# Patient Record
Sex: FEMALE | Race: WHITE | NOT HISPANIC OR LATINO | Employment: FULL TIME | URBAN - METROPOLITAN AREA
[De-identification: names, ages, dates, MRNs, and addresses within clinical notes are randomized per-mention and may not be internally consistent; named-entity substitution may affect disease eponyms.]

---
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
## 2019-02-27 ENCOUNTER — OFFICE VISIT (OUTPATIENT)
Dept: FAMILY MEDICINE CLINIC | Facility: CLINIC | Age: 19
End: 2019-02-27
Payer: COMMERCIAL

## 2019-02-27 ENCOUNTER — APPOINTMENT (OUTPATIENT)
Dept: LAB | Facility: MEDICAL CENTER | Age: 19
End: 2019-02-27
Payer: COMMERCIAL

## 2019-02-27 VITALS
DIASTOLIC BLOOD PRESSURE: 70 MMHG | BODY MASS INDEX: 24.79 KG/M2 | HEIGHT: 65 IN | SYSTOLIC BLOOD PRESSURE: 106 MMHG | WEIGHT: 148.8 LBS

## 2019-02-27 DIAGNOSIS — G56.03 BILATERAL CARPAL TUNNEL SYNDROME: Primary | ICD-10-CM

## 2019-02-27 DIAGNOSIS — G56.03 CARPAL TUNNEL SYNDROME, BILATERAL: Primary | ICD-10-CM

## 2019-02-27 DIAGNOSIS — L70.0 ACNE VULGARIS: ICD-10-CM

## 2019-02-27 PROCEDURE — 99213 OFFICE O/P EST LOW 20 MIN: CPT | Performed by: FAMILY MEDICINE

## 2019-02-27 PROCEDURE — 86038 ANTINUCLEAR ANTIBODIES: CPT

## 2019-02-27 PROCEDURE — 86430 RHEUMATOID FACTOR TEST QUAL: CPT | Performed by: FAMILY MEDICINE

## 2019-02-27 PROCEDURE — 36415 COLL VENOUS BLD VENIPUNCTURE: CPT | Performed by: FAMILY MEDICINE

## 2019-02-27 RX ORDER — NAPROXEN 500 MG/1
500 TABLET ORAL 2 TIMES DAILY WITH MEALS
Qty: 60 TABLET | Refills: 1 | Status: SHIPPED | OUTPATIENT
Start: 2019-02-27

## 2019-02-27 RX ORDER — CLINDAMYCIN PHOSPHATE 10 UG/ML
LOTION TOPICAL 2 TIMES DAILY
Qty: 60 ML | Refills: 5 | Status: SHIPPED | OUTPATIENT
Start: 2019-02-27

## 2019-02-27 NOTE — PROGRESS NOTES
Assessment/Plan: For her hands, we will refer her to Orthopedics  Patient will get her blood work done today  Refilled her naproxen  For her acne, Rx for Cleocin lotion  She will call us if that does not help her, if not, we will refer her to Dermatology  Follow-up in 6 months or p r n  No problem-specific Assessment & Plan notes found for this encounter  Diagnoses and all orders for this visit:    Bilateral carpal tunnel syndrome  -     Ambulatory referral to Orthopedic Surgery; Future  -     naproxen (NAPROSYN) 500 mg tablet; Take 1 tablet (500 mg total) by mouth 2 (two) times a day with meals  -     EVELINE w/Reflex  -     RF Screen w/ Reflex to Titer    Acne vulgaris  -     clindamycin (CLEOCIN T) 1 % lotion; Apply topically 2 (two) times a day          Subjective:      Patient ID: Gustavo Arreaga is a 22 y o  female  Patient here today for follow-up  Patient continues to have bilateral hand stiffness pain and numbness  It was worse in the right, now it is in both hands  Patient is still welding  She does wear the splint, it does help a little  Patient also concerned about acne on her face, she has tried over-the-counter medications without much relief  Arthritis   Presents for follow-up visit  She complains of pain and stiffness  The symptoms have been worsening  Affected location: Bilateral hands  Pertinent negatives include no fatigue, fever or rash  Compliance with total regimen is %  Compliance with medications is %  The following portions of the patient's history were reviewed and updated as appropriate:   She  has a past medical history of Hearing loss    She   Patient Active Problem List    Diagnosis Date Noted    Acne vulgaris 02/27/2019    Syncope and collapse 11/06/2018    Other headache syndrome 11/06/2018    Spell of altered cognition 11/06/2018    Bilateral carpal tunnel syndrome 10/23/2018    Positive EVELINE (antinuclear antibody) 10/23/2018    Vitamin D deficiency 10/10/2018     She  has a past surgical history that includes Tonsillectomy and adenoidectomy and Tooth extraction  Her family history includes Arthritis in her maternal grandmother; Cervical cancer in her paternal grandmother; Diabetes in her paternal grandfather; Endometriosis in her mother; Heart disease in her father; Seizures in her father  She  reports that she has never smoked  She has never used smokeless tobacco  She reports that she does not drink alcohol  Her drug history is not on file  Current Outpatient Medications   Medication Sig Dispense Refill    naproxen (NAPROSYN) 500 mg tablet Take 1 tablet (500 mg total) by mouth 2 (two) times a day with meals 60 tablet 1    cholecalciferol (VITAMIN D3) 1,000 units tablet Take 2 tablets (2,000 Units total) by mouth daily (Patient not taking: Reported on 2/27/2019) 60 tablet 2    clindamycin (CLEOCIN T) 1 % lotion Apply topically 2 (two) times a day 60 mL 5     No current facility-administered medications for this visit  Current Outpatient Medications on File Prior to Visit   Medication Sig    [DISCONTINUED] naproxen (NAPROSYN) 500 mg tablet Take 1 tablet (500 mg total) by mouth 2 (two) times a day with meals    cholecalciferol (VITAMIN D3) 1,000 units tablet Take 2 tablets (2,000 Units total) by mouth daily (Patient not taking: Reported on 2/27/2019)     No current facility-administered medications on file prior to visit  She has No Known Allergies       Review of Systems   Constitutional: Negative  Negative for fatigue and fever  Respiratory: Negative  Cardiovascular: Negative  Gastrointestinal: Negative  Genitourinary: Negative  Musculoskeletal: Positive for arthralgias, arthritis and stiffness  Skin: Negative for rash          As per HPI         Objective:      /70   Ht 5' 4 5" (1 638 m)   Wt 67 5 kg (148 lb 12 8 oz)   BMI 25 15 kg/m²          Physical Exam   Constitutional: She is oriented to person, place, and time  She appears well-developed and well-nourished  No distress  HENT:   Head: Normocephalic and atraumatic  Eyes: Conjunctivae are normal    Cardiovascular: Normal rate, regular rhythm and normal heart sounds  Exam reveals no gallop and no friction rub  No murmur heard  Pulmonary/Chest: Effort normal and breath sounds normal  No respiratory distress  She has no wheezes  She has no rales  Musculoskeletal: She exhibits tenderness (Bilateral hands)  She exhibits no edema  Neurological: She is alert and oriented to person, place, and time  Skin: She is not diaphoretic  Mild acne on her face   Psychiatric: She has a normal mood and affect  Her behavior is normal  Judgment and thought content normal    Vitals reviewed

## 2019-02-28 LAB — RHEUMATOID FACT SER QL LA: NEGATIVE

## 2019-03-01 LAB — RYE IGE QN: NEGATIVE

## 2020-01-25 ENCOUNTER — HOSPITAL ENCOUNTER (EMERGENCY)
Facility: HOSPITAL | Age: 20
Discharge: ANOTHER HOSPITAL | End: 2020-01-25
Attending: EMERGENCY MEDICINE | Admitting: EMERGENCY MEDICINE
Payer: COMMERCIAL

## 2020-01-25 VITALS
BODY MASS INDEX: 30.4 KG/M2 | HEART RATE: 85 BPM | WEIGHT: 179.9 LBS | SYSTOLIC BLOOD PRESSURE: 144 MMHG | RESPIRATION RATE: 18 BRPM | OXYGEN SATURATION: 97 % | DIASTOLIC BLOOD PRESSURE: 77 MMHG

## 2020-01-25 DIAGNOSIS — Z3A.39 39 WEEKS GESTATION OF PREGNANCY: Primary | ICD-10-CM

## 2020-01-25 PROCEDURE — 96361 HYDRATE IV INFUSION ADD-ON: CPT

## 2020-01-25 PROCEDURE — 99283 EMERGENCY DEPT VISIT LOW MDM: CPT | Performed by: EMERGENCY MEDICINE

## 2020-01-25 PROCEDURE — 96374 THER/PROPH/DIAG INJ IV PUSH: CPT

## 2020-01-25 PROCEDURE — 99284 EMERGENCY DEPT VISIT MOD MDM: CPT

## 2020-01-25 RX ORDER — SODIUM CHLORIDE 9 MG/ML
1000 INJECTION, SOLUTION INTRAVENOUS ONCE
Status: COMPLETED | OUTPATIENT
Start: 2020-01-25 | End: 2020-01-25

## 2020-01-25 RX ORDER — ONDANSETRON 4 MG/1
4 TABLET, FILM COATED ORAL EVERY 8 HOURS PRN
COMMUNITY

## 2020-01-25 RX ORDER — ONDANSETRON 4 MG/1
4 TABLET, ORALLY DISINTEGRATING ORAL ONCE
Status: DISCONTINUED | OUTPATIENT
Start: 2020-01-25 | End: 2020-01-25 | Stop reason: HOSPADM

## 2020-01-25 RX ORDER — ONDANSETRON 2 MG/ML
4 INJECTION INTRAMUSCULAR; INTRAVENOUS ONCE
Status: COMPLETED | OUTPATIENT
Start: 2020-01-25 | End: 2020-01-25

## 2020-01-25 RX ADMIN — SODIUM CHLORIDE 1000 ML/HR: 0.9 INJECTION, SOLUTION INTRAVENOUS at 00:21

## 2020-01-25 RX ADMIN — ONDANSETRON 4 MG: 2 INJECTION INTRAMUSCULAR; INTRAVENOUS at 00:21

## 2020-01-25 NOTE — ED NOTES
Patient vomiting at this time  ODT zofran ordered for patient to take prior to leaving       Nikki Marquez RN  01/25/20 [id number]

## 2020-01-25 NOTE — ED NOTES
Patient asking about transport time  This writer explained that transfer center is working on getting an ambulance  Patient states, "I gotta get out of here   I'm just going to [address] " Dr Eva Lugo aware and in to speak with patient      José Manuel Fritz RN  01/25/20 3054

## 2020-01-25 NOTE — EMTALA/ACUTE CARE TRANSFER
[address] [place] [id number]  Dept: [phone]      EMTALA TRANSFER CONSENT    NAME Ramona Johansen                                         2000                              MRN [id number]    I have been informed of my rights regarding examination, treatment, and transfer   by Dr Christiano Kidd DO    Benefits: Patient preference, Specialized equipment and/or services available at the receiving facility (Include comment)________________________    Risks: Potential for delay in receiving treatment, Increased discomfort during transfer, Possible worsening of condition or death during transfer, Potential deterioration of medical condition, Loss of IV      Consent for Transfer:  I acknowledge that my medical condition has been evaluated and explained to me by the emergency department physician or other qualified medical person and/or my attending physician, who has recommended that I be transferred to the service of  Accepting Physician: Jayme Sebastian at [facility] Name, [address] : [address] [facility]  The above potential benefits of such transfer, the potential risks associated with such transfer, and the probable risks of not being transferred have been explained to me, and I fully understand them  The doctor has explained that, in my case, the benefits of transfer outweigh the risks  I agree to be transferred  I authorize the performance of emergency medical procedures and treatments upon me in both transit and upon arrival at the receiving facility  Additionally, I authorize the release of any and all medical records to the receiving facility and request they be transported with me, if possible  I understand that the safest mode of transportation during a medical emergency is an ambulance and that the Hospital advocates the use of this mode of transport   Risks of traveling to the receiving facility by car, including absence of medical control, life sustaining equipment, such as oxygen, and medical personnel has been explained to me and I fully understand them  (CATHERINE CORRECT BOX BELOW)  [  ]  I consent to the stated transfer and to be transported by ambulance/helicopter  [  ]  I consent to the stated transfer, but refuse transportation by ambulance and accept full responsibility for my transportation by car  I understand the risks of non-ambulance transfers and I exonerate the Hospital and its staff from any deterioration in my condition that results from this refusal     X___________________________________________    DATE  20  TIME________  Signature of patient or legally responsible individual signing on patient behalf           RELATIONSHIP TO PATIENT_________________________          Provider Certification    NAME Bobby Keene                                         2000                              MRN [id number]    A medical screening exam was performed on the above named patient  Based on the examination:    Condition Necessitating Transfer The encounter diagnosis was 39 weeks gestation of pregnancy  Patient Condition: Pregnant woman having contractions    Reason for Transfer: Level of Care needed not available at this facility    Transfer Requirements: [address]   · Space available and qualified personnel available for treatment as acknowledged by Janeth Russell (R)  · Agreed to accept transfer and to provide appropriate medical treatment as acknowledged by       Maggie Leon  · Appropriate medical records of the examination and treatment of the patient are provided at the time of transfer   [address], [address] _______  · Transfer will be performed by qualified personnel from    and appropriate transfer equipment as required, including the use of necessary and appropriate life support measures      Provider Certification: I have examined the patient and explained the following risks and benefits of being transferred/refusing transfer to the patient/family:  General risk, such as traffic hazards, adverse weather conditions, rough terrain or turbulence, possible failure of equipment (including vehicle or aircraft), or consequences of actions of persons outside the control of the transport personnel, Unanticipated needs of medical equipment and personnel during transport, Risk of worsening condition, The possibility of a transport vehicle being unavailable      Based on these reasonable risks and benefits to the patient and/or the unborn child(yuridia), and based upon the information available at the time of the patients examination, I certify that the medical benefits reasonably to be expected from the provision of appropriate medical treatments at another medical facility outweigh the increasing risks, if any, to the individuals medical condition, and in the case of labor to the unborn child, from effecting the transfer      X____________________________________________ DATE 01/25/20        TIME_______      ORIGINAL - SEND TO MEDICAL RECORDS   COPY - SEND WITH PATIENT DURING TRANSFER

## 2020-01-25 NOTE — ED PROVIDER NOTES
History  Chief Complaint   Patient presents with    Contractions     Patient reports contractions all day but states that they are now 3 minutes apart  Her water didn't break and states that she has been losing her mucus plug "for a while now"     23-year-old female  at 44 weeks of gestation presents to ED with contractions  Patient was recently seen at [address] [place] [address] [address] and cuts down for a checkup  Patient states that she has lost her mucus plug, however isn't aware if she had her fluidly  Patient describes Q 3 minutes contractions with nausea  Patient is requested to be transferred to [facility]           Prior to Admission Medications   Prescriptions Last Dose Informant Patient Reported? Taking? cholecalciferol (VITAMIN D3) 1,000 units tablet Not Taking at Unknown time  No No   Sig: Take 2 tablets (2,000 Units total) by mouth daily   Patient not taking: Reported on 2019   clindamycin (CLEOCIN T) 1 % lotion Not Taking at Unknown time  No No   Sig: Apply topically 2 (two) times a day   Patient not taking: Reported on 2020   naproxen (NAPROSYN) 500 mg tablet Not Taking at Unknown time  No No   Sig: Take 1 tablet (500 mg total) by mouth 2 (two) times a day with meals   Patient not taking: Reported on 2020   ondansetron (ZOFRAN) 4 mg tablet   Yes Yes   Sig: Take 4 mg by mouth every 8 (eight) hours as needed for nausea or vomiting      Facility-Administered Medications: None       Past Medical History:   Diagnosis Date    Hearing loss        Past Surgical History:   Procedure Laterality Date    TONSILLECTOMY      TONSILLECTOMY AND ADENOIDECTOMY      TOOTH EXTRACTION         Family History   Problem Relation Age of Onset    Endometriosis Mother     Seizures Father     Heart disease Father     Arthritis Maternal Grandmother     Cervical cancer Paternal Grandmother     Diabetes Paternal Grandfather      I have reviewed and agree with the history as documented      Social History Tobacco Use    Smoking status: Never Smoker    Smokeless tobacco: Never Used   Substance Use Topics    Alcohol use: No    Drug use: Not Currently        Review of Systems   Gastrointestinal: Positive for abdominal pain, nausea and vomiting  All other systems reviewed and are negative  Physical Exam  Physical Exam   Constitutional: She is oriented to person, place, and time  She appears well-developed and well-nourished  She appears distressed  HENT:   Head: Normocephalic and atraumatic  Eyes: Pupils are equal, round, and reactive to light  EOM are normal    Neck: Normal range of motion  Neck supple  Cardiovascular: Normal rate, regular rhythm, normal heart sounds and intact distal pulses  Pulmonary/Chest: Effort normal and breath sounds normal  No stridor  No respiratory distress  Abdominal: Soft  Bowel sounds are normal  She exhibits no distension  Musculoskeletal: Normal range of motion  Neurological: She is alert and oriented to person, place, and time  Skin: Skin is warm and dry  Psychiatric: She has a normal mood and affect  Her behavior is normal    Nursing note and vitals reviewed        Vital Signs  ED Triage Vitals [01/25/20 0009]   Temp Pulse Respirations Blood Pressure SpO2   -- 85 18 144/77 97 %      Temp Source Heart Rate Source Patient Position - Orthostatic VS BP Location FiO2 (%)   Oral Monitor Lying Right arm --      Pain Score       Worst Possible Pain           Vitals:    01/25/20 0009   BP: 144/77   Pulse: 85   Patient Position - Orthostatic VS: Lying         Visual Acuity      ED Medications  Medications   ondansetron (ZOFRAN-ODT) dispersible tablet 4 mg (4 mg Oral Not Given 1/25/20 0124)   ondansetron (ZOFRAN) injection 4 mg (4 mg Intravenous Given 1/25/20 0021)   sodium chloride 0 9 % infusion (0 mL/hr Intravenous Stopped 1/25/20 0125)       Diagnostic Studies  Results Reviewed     None                 No orders to display              Procedures  Procedures ED Course  ED Course as of Jan 25 0126   Sat Jan 25, 2020   0012 With RN as chaperone, patient requesting to go to [facility]       2693 Case discussed with the transfer center and the evaluate [facility], Dr Sedrick Roberson to accept patient  Internal exam chaperoned by RN       3073 Patient choosing to leave against medical advice  Patient was secured to the evaluate possible  Patient is counseled extensively regarding risks permanent injury or death to herself or fevers  Patient states that she just wants to leave  Transfer Center notified                                    MDM      Disposition  Final diagnoses:   39 weeks gestation of pregnancy     Time reflects when diagnosis was documented in both MDM as applicable and the Disposition within this note     Time User Action Codes Description Comment    1/25/2020 12:52 AM Edy Townsend Add [Z3A 39] 39 weeks gestation of pregnancy       ED Disposition     ED Disposition Condition Date/Time Comment    AMA  Sat Jan 25, 2020  1:26 AM Loni Pascal should be transferred out to [facility]        MD Documentation      Most Recent Value   Patient Condition  Pregnant woman having contractions   Reason for Transfer  Level of Care needed not available at this facility   Benefits of Transfer  Patient preference, Specialized equipment and/or services available at the receiving facility (Include comment)________________________   Risks of Transfer  Potential for delay in receiving treatment, Increased discomfort during transfer, Possible worsening of condition or death during transfer, Potential deterioration of medical condition, Loss of IV   Accepting Physician  [address] Name, [address]    (Name & Tel number)  Genia Greene   Provider Certification  General risk, such as traffic hazards, adverse weather conditions, rough terrain or turbulence, possible failure of equipment (including vehicle or aircraft), or consequences of actions of persons outside the control of the transport personnel, Unanticipated needs of medical equipment and personnel during transport, Risk of worsening condition, The possibility of a transport vehicle being unavailable      RN Documentation      Most [facility] Name, [place] [facility] (Name & Tel number)  [address] Damaris Loaiza      Follow-up Information    None         Patient's Medications   Discharge Prescriptions    No medications on file     No discharge procedures on file      ED Provider  Electronically Signed by           Syed Hua,   01/25/20 John Bernal, DO  01/25/20 0126

## 2020-01-25 NOTE — EMTALA/ACUTE CARE TRANSFER
[address]  [facility] [address]  Dept: [phone]      EMTALA TRANSFER CONSENT    NAME Darwin Adams                                         2000                              MRN [id number]    I have been informed of my rights regarding examination, treatment, and transfer   by Dr Tim Carmichael,     Benefits:      Risks:        Consent for Transfer:  I acknowledge that my medical condition has been evaluated and explained to me by the emergency department physician or other qualified medical person and/or my attending physician, who has recommended that I be transferred to the service of    at    The above potential benefits of such transfer, the potential risks associated with such transfer, and the probable risks of not being transferred have been explained to me, and I fully understand them  The doctor has explained that, in my case, the benefits of transfer outweigh the risks  I agree to be transferred  I authorize the performance of emergency medical procedures and treatments upon me in both transit and upon arrival at the receiving facility  Additionally, I authorize the release of any and all medical records to the receiving facility and request they be transported with me, if possible  I understand that the safest mode of transportation during a medical emergency is an ambulance and that the Hospital advocates the use of this mode of transport  Risks of traveling to the receiving facility by car, including absence of medical control, life sustaining equipment, such as oxygen, and medical personnel has been explained to me and I fully understand them  (CATHERINE CORRECT BOX BELOW)  [  ]  I consent to the stated transfer and to be transported by ambulance/helicopter  [  ]  I consent to the stated transfer, but refuse transportation by ambulance and accept full responsibility for my transportation by car    I understand the risks of non-ambulance transfers and I exonerate the Hospital and its staff from any deterioration in my condition that results from this refusal     X___________________________________________    DATE  20  TIME________  Signature of patient or legally responsible individual signing on patient behalf           RELATIONSHIP TO PATIENT_________________________          Provider Certification    NAME Familia KIRBY 2000                              MRN [id number]    A medical screening exam was performed on the above named patient  Based on the examination:    Condition Necessitating Transfer The encounter diagnosis was 39 weeks gestation of pregnancy  Patient Condition:      Reason for Transfer:      Transfer Requirements: Facility     · Space available and qualified personnel available for treatment as acknowledged by    · Agreed to accept transfer and to provide appropriate medical treatment as acknowledged by          · Appropriate medical records of the examination and treatment of the patient are provided at the time of transfer   [address] [place] [address] [address] _______  · Transfer will be performed by qualified personnel from    and appropriate transfer equipment as required, including the use of necessary and appropriate life support measures      Provider Certification: I have examined the patient and explained the following risks and benefits of being transferred/refusing transfer to the patient/family:         Based on these reasonable risks and benefits to the patient and/or the unborn child(yuridia), and based upon the information available at the time of the patients examination, I certify that the medical benefits reasonably to be expected from the provision of appropriate medical treatments at another medical facility outweigh the increasing risks, if any, to the individuals medical condition, and in the case of labor to the unborn child, from effecting the transfer      X____________________________________________ DATE 01/25/20        TIME_______      ORIGINAL - SEND TO MEDICAL RECORDS   COPY - SEND WITH PATIENT DURING TRANSFER

## 2020-01-25 NOTE — ED NOTES
Patient continues to state that she wants to leave  Aware that it will be against medical advice  Risks reviewed with her by Dr Massey S  [facility]   IV removed at this time     Nikki Marquez RN  01/25/20 0808

## 2020-03-18 ENCOUNTER — OPTICAL OFFICE (OUTPATIENT)
Dept: URBAN - NONMETROPOLITAN AREA CLINIC 4 | Facility: CLINIC | Age: 20
Setting detail: OPHTHALMOLOGY
End: 2020-03-18
Payer: COMMERCIAL

## 2020-03-18 ENCOUNTER — DOCTOR'S OFFICE (OUTPATIENT)
Dept: URBAN - NONMETROPOLITAN AREA CLINIC 1 | Facility: CLINIC | Age: 20
Setting detail: OPHTHALMOLOGY
End: 2020-03-18
Payer: COMMERCIAL

## 2020-03-18 DIAGNOSIS — H52.13: ICD-10-CM

## 2020-03-18 DIAGNOSIS — H52.7: ICD-10-CM

## 2020-03-18 PROCEDURE — 92004 COMPRE OPH EXAM NEW PT 1/>: CPT | Performed by: OPTOMETRIST

## 2020-03-18 PROCEDURE — V2103 SPHEROCYLINDR 4.00D/12-2.00D: HCPCS | Performed by: OPTOMETRIST

## 2020-03-18 PROCEDURE — V2100 LENS SPHER SINGLE PLANO 4.00: HCPCS | Performed by: OPTOMETRIST

## 2020-03-18 PROCEDURE — V2020 VISION SVCS FRAMES PURCHASES: HCPCS | Performed by: OPTOMETRIST

## 2020-03-18 ASSESSMENT — REFRACTION_MANIFEST
OS_CYLINDER: SPH
OD_VA1: 20/20
OD_SPHERE: -0.25
OS_SPHERE: -0.25
OD_AXIS: 075
OD_CYLINDER: -0.25
OS_VA2: 20/20
OD_VA2: 20/20
OS_VA1: 20/20

## 2020-03-18 ASSESSMENT — SPHEQUIV_DERIVED
OD_SPHEQUIV: -0.75
OD_SPHEQUIV: -0.375
OS_SPHEQUIV: -0.375

## 2020-03-18 ASSESSMENT — REFRACTION_AUTOREFRACTION
OD_CYLINDER: -0.50
OS_SPHERE: -0.25
OD_SPHERE: -0.50
OS_CYLINDER: -0.25
OS_AXIS: 031
OD_AXIS: 073

## 2020-03-18 ASSESSMENT — CONFRONTATIONAL VISUAL FIELD TEST (CVF)
OS_FINDINGS: FULL
OD_FINDINGS: FULL

## 2020-03-18 ASSESSMENT — VISUAL ACUITY
OD_BCVA: 20/20-2
OS_BCVA: 20/20-2

## 2025-06-24 ENCOUNTER — DOCTOR'S OFFICE (OUTPATIENT)
Dept: URBAN - NONMETROPOLITAN AREA CLINIC 1 | Facility: CLINIC | Age: 25
Setting detail: OPHTHALMOLOGY
End: 2025-06-24
Payer: COMMERCIAL

## 2025-06-24 DIAGNOSIS — H52.223: ICD-10-CM

## 2025-06-24 PROCEDURE — 92004 COMPRE OPH EXAM NEW PT 1/>: CPT

## 2025-06-24 PROCEDURE — 92015 DETERMINE REFRACTIVE STATE: CPT

## 2025-06-24 ASSESSMENT — CONFRONTATIONAL VISUAL FIELD TEST (CVF)
OS_FINDINGS: FULL
OD_FINDINGS: FULL

## 2025-06-24 ASSESSMENT — REFRACTION_MANIFEST
OS_VA1: 20/20
OS_ADD: +1.00
OD_CYLINDER: -0.25
OD_SPHERE: PLANO
OD_VA1: 20/20
OS_CYLINDER: -0.50
OS_AXIS: 170
OS_SPHERE: -0.25
OD_VA2: 20/20
OU_VA: 20/20
OD_ADD: +1.00
OS_VA2: 20/20
OD_AXIS: 175

## 2025-06-24 ASSESSMENT — REFRACTION_AUTOREFRACTION
OS_SPHERE: +0.25
OS_CYLINDER: 0.00
OD_CYLINDER: -0.25
OD_SPHERE: +0.25
OD_AXIS: 178
OS_AXIS: 031

## 2025-06-24 ASSESSMENT — TONOMETRY
OS_IOP_MMHG: 18
OD_IOP_MMHG: 18

## 2025-06-24 ASSESSMENT — VISUAL ACUITY
OS_BCVA: 20/30-1
OD_BCVA: 20/40+1